# Patient Record
Sex: MALE | URBAN - METROPOLITAN AREA
[De-identification: names, ages, dates, MRNs, and addresses within clinical notes are randomized per-mention and may not be internally consistent; named-entity substitution may affect disease eponyms.]

---

## 2022-06-28 ENCOUNTER — APPOINTMENT (RX ONLY)
Dept: URBAN - METROPOLITAN AREA CLINIC 329 | Facility: CLINIC | Age: 72
Setting detail: DERMATOLOGY
End: 2022-06-28

## 2022-06-28 DIAGNOSIS — Z71.89 OTHER SPECIFIED COUNSELING: ICD-10-CM

## 2022-06-28 DIAGNOSIS — L81.4 OTHER MELANIN HYPERPIGMENTATION: ICD-10-CM

## 2022-06-28 DIAGNOSIS — L72.0 EPIDERMAL CYST: ICD-10-CM

## 2022-06-28 DIAGNOSIS — L82.1 OTHER SEBORRHEIC KERATOSIS: ICD-10-CM

## 2022-06-28 DIAGNOSIS — L57.0 ACTINIC KERATOSIS: ICD-10-CM

## 2022-06-28 PROCEDURE — ? FULL BODY SKIN EXAM - DECLINED

## 2022-06-28 PROCEDURE — ? DEFER

## 2022-06-28 PROCEDURE — ? COUNSELING

## 2022-06-28 PROCEDURE — ? OTHER

## 2022-06-28 PROCEDURE — ? ADDITIONAL NOTES

## 2022-06-28 PROCEDURE — ? SUNSCREEN RECOMMENDATIONS

## 2022-06-28 PROCEDURE — 17003 DESTRUCT PREMALG LES 2-14: CPT

## 2022-06-28 PROCEDURE — ? LIQUID NITROGEN

## 2022-06-28 PROCEDURE — 99203 OFFICE O/P NEW LOW 30 MIN: CPT | Mod: 25

## 2022-06-28 PROCEDURE — 17000 DESTRUCT PREMALG LESION: CPT

## 2022-06-28 ASSESSMENT — LOCATION DETAILED DESCRIPTION DERM
LOCATION DETAILED: LEFT SUPERIOR LATERAL MALAR CHEEK
LOCATION DETAILED: LEFT LATERAL CANTHUS
LOCATION DETAILED: LEFT INFERIOR LATERAL FOREHEAD
LOCATION DETAILED: RIGHT FOREHEAD
LOCATION DETAILED: LEFT MID TEMPLE
LOCATION DETAILED: LEFT RIB CAGE
LOCATION DETAILED: RIGHT CENTRAL MALAR CHEEK
LOCATION DETAILED: LEFT VENTRAL PROXIMAL FOREARM
LOCATION DETAILED: LEFT LATERAL MALAR CHEEK

## 2022-06-28 ASSESSMENT — LOCATION ZONE DERM
LOCATION ZONE: TRUNK
LOCATION ZONE: ARM
LOCATION ZONE: EYELID
LOCATION ZONE: FACE

## 2022-06-28 ASSESSMENT — LOCATION SIMPLE DESCRIPTION DERM
LOCATION SIMPLE: LEFT FOREHEAD
LOCATION SIMPLE: ABDOMEN
LOCATION SIMPLE: LEFT CHEEK
LOCATION SIMPLE: RIGHT CHEEK
LOCATION SIMPLE: LEFT EYELID
LOCATION SIMPLE: LEFT FOREARM
LOCATION SIMPLE: RIGHT FOREHEAD
LOCATION SIMPLE: LEFT TEMPLE

## 2022-06-28 NOTE — HPI: SKIN LESION
How Severe Is Your Skin Lesion?: mild
Is This A New Presentation, Or A Follow-Up?: Skin Lesion
Additional History: Pt is here for a skin check. He has a few lesions of concern that are new and irritating. Pt denies any bleeding. Pt denies family or personal history of melanoma. Pt declined a full body exam.

## 2022-06-28 NOTE — PROCEDURE: MIPS QUALITY
Quality 110: Preventive Care And Screening: Influenza Immunization: Influenza Immunization previously received during influenza season
Quality 431: Preventive Care And Screening: Unhealthy Alcohol Use - Screening: Patient did not receive brief counseling if identified as an unhealthy alcohol user, reason not given
Quality 226: Preventive Care And Screening: Tobacco Use: Screening And Cessation Intervention: Tobacco Screening not Performed for Medical Reasons
Quality 111:Pneumonia Vaccination Status For Older Adults: Pneumococcal vaccine administered on or after patient’s 60th birthday and before the end of the measurement period
Detail Level: Detailed
Quality 130: Documentation Of Current Medications In The Medical Record: Current Medications Documented

## 2022-06-28 NOTE — PROCEDURE: COUNSELING
Patient is scheduled with Tammi Jamil on Monday, 4/20/20 at 9:00AM.     Message has been left for patient, Due to safety for our patient's, provider's and staff we are currently providing sessions via video or telephone for appointment scheduled times and dates. Please call us back to consent for your appointment to be done via video or telephone otherwise, your appointment will need to be rescheduled.      Sessions via Video this is Provider's Preference (Patient will need [employer] and ZOOM lorraine)  1.  Patient must download the lorraine ZOOM on mobile device with camera (blue lorraine icon with white camera)     2. Instructions for appointment will be provided on "[employer]     3. Patient will need to check in for appointment on "[employer] by going to the \"Appointments and Visits\" tab and choosing their Video Visit Appt     4. Patient must check in for appointment, up to 4 days in advance.    5. Patient must complete the Pre-Check In information before beginning the visit with their provider     For PSR: if patient confirms video visit, change appointment to [employer] VIDEO and add appt note: VIDEO VISIT     Sessions via Telephone   1. Stay close by your phone 15 minutes before and 15 minutes after your scheduled appointment time, in case provider is running early or late.      2. Phone call may come from a blocked number.      3. If patient is unable to be reached for their appointment at the time when the providers calls, the provider will try calling again 5 minutes later. If the second call is missed, patient's appointment will be cancelled.   
Detail Level: Zone
Detail Level: Detailed
Detail Level: Generalized

## 2022-06-28 NOTE — PROCEDURE: OTHER
Detail Level: Zone
Other (Free Text): Due to location, we will refer out. Pt is okay with this plan.
Render Risk Assessment In Note?: no
Note Text (......Xxx Chief Complaint.): This diagnosis correlates with the

## 2024-06-17 NOTE — PROCEDURE: FULL BODY SKIN EXAM - DECLINED
CM contacted the client to remind him of his EPIC assessment and requested that it be rescheduled. CM agreed, and CM will continue reaching out to the client.          
Instructions: This plan will send the code FBSD to the PM system.  DO NOT or CHANGE the price.
Price (Do Not Change): 0.00
Detail Level: Simple